# Patient Record
(demographics unavailable — no encounter records)

---

## 2025-04-09 NOTE — HISTORY OF PRESENT ILLNESS
[FreeTextEntry1] : 75F presenting with rectal bleeding that started about two years ago. Has worsened throughout the years. Currently patient will see bright red blood in toilet bowl usually with straining. A month ago, started noticing continuous rectal pressure relieved after a bowel movement. Denies any rectal protrusion but admits to discomfort. Bowel movement is usually once every two days. Stool will vary between soft and hard and occasionally will require straining. Currently will take stool softener as needed. Denies being on any anticoagulants.   Referred by: [facility] ED  PMH: Breast Ca s/p bilateral lumpectomy 2016, Lung Ca s/p lung resection 2023, Hypothyroid, AV regurgitation.  Meds: Lipitor, Ambien, Metoprolol, Synthroid All: Sulfamethoxazole PSH: MARIJA Wedge resection, B/l lumpectomy with XRT Denies family hx of CRC/IBD  Cscope: Reports last done 10/2024 with benign findings.

## 2025-04-09 NOTE — PHYSICAL EXAM
[JVD] : no jugular venous distention  [Normal Breath Sounds] : Normal breath sounds [Alert] : alert [Calm] : calm [de-identified] : Soft, nontender, nondistended. Pfannensteil scar? [de-identified] : Well appearing female in NAD [de-identified] : MMM [de-identified] : mild diffuse urticarial rash [FreeTextEntry1] : The pt was examined in the left lateral decubitus position with a medical assistant present for the entirety of the examination. Visual examination of the anal verge with effacement of the buttocks revealed circumferential soft external hemorrhoidal disease otherwise no masses, ulcerations, fissures or skin rashes. Digital rectal exam revealed no palpable mass or blood with sphincter tone within normal limits. A lubricated anoscope was then inserted revealing healthy appearing distal rectal mucosa and anoderm with three grade II/III, noninflamed internal hemorrhoids.  The patient tolerated the exam well.

## 2025-04-09 NOTE — PHYSICAL EXAM
[JVD] : no jugular venous distention  [Normal Breath Sounds] : Normal breath sounds [Alert] : alert [Calm] : calm [de-identified] : Soft, nontender, nondistended. Pfannensteil scar? [de-identified] : Well appearing female in NAD [de-identified] : MMM [de-identified] : mild diffuse urticarial rash [FreeTextEntry1] : The pt was examined in the left lateral decubitus position with a medical assistant present for the entirety of the examination. Visual examination of the anal verge with effacement of the buttocks revealed circumferential soft external hemorrhoidal disease otherwise no masses, ulcerations, fissures or skin rashes. Digital rectal exam revealed no palpable mass or blood with sphincter tone within normal limits. A lubricated anoscope was then inserted revealing healthy appearing distal rectal mucosa and anoderm with three grade II/III, noninflamed internal hemorrhoids.  The patient tolerated the exam well.

## 2025-04-09 NOTE — PLAN
[TextEntry] : - Recommend Miralax/Benefiber daily. - 6-8 glasses of water daily. - Senna on every third day if no cathartic BM - RTC in 3 weeks - Recommend consultation with PCP and referral for Allergist considering urticarial rash.

## 2025-04-09 NOTE — HISTORY OF PRESENT ILLNESS
[FreeTextEntry1] : 75F presenting with rectal bleeding that started about two years ago. Has worsened throughout the years. Currently patient will see bright red blood in toilet bowl usually with straining. A month ago, started noticing continuous rectal pressure relieved after a bowel movement. Denies any rectal protrusion but admits to discomfort. Bowel movement is usually once every two days. Stool will vary between soft and hard and occasionally will require straining. Currently will take stool softener as needed. Denies being on any anticoagulants.   Referred by: [facility] ED  PMH: Breast Ca s/p bilateral lumpectomy 2016, Lung Ca s/p lung resection 2023, Hypothyroid, AV regurgitation.  Meds: Lipitor, Ambien, Metoprolol, Synthroid All: Sulfamethoxazole PSH: MARIJA Wedge resection, B/l lumpectomy with XRT Denies family hx of CRC/IBD  Cscope: Reports last done 10/2024 with benign findings.